# Patient Record
(demographics unavailable — no encounter records)

---

## 2024-11-26 NOTE — DISCUSSION/SUMMARY
[FreeTextEntry1] : 1. Hx of carotid artery aneurysm: s/p stenting and diverter in 2019. Recommend continuing Aspirin 81mg daily and rosuvastatin 10mg daily. Goal BP less than 130/80. Continued follow up with neurosurgery.  2. HTN: appears controlled. Goal BP less than 130/80. Patient noted her legs get swollen toward the end of the day and when she wears high heels, in the past. Better in the morning when she awakes. Recommended discontinuing amlodipine 5mg daily and starting losartan/HCTZ 50/12.5mg daily. The lower extremity greatly improved. Continue Toprol XL 25mg daily (high risk medication with no signs of toxicity).  3. HLD: continue rosuvastatin 10mg daily.  4. Chest Pain/Intermittent Palpitations: no ischemia noted on ETT and rare ectopy noted on 7 Day Zio. Patient recently went on vacation. She had to walk up hills. She noted central chest tightness when she did the walking. It resolved immediately with rest. Recommended CTA of the coronary arteries, which she underwent 4/12/2024. It demonstrated normal coronary arteries. Continue Toprol XL 25mg daily.  5. Dilated Aortic Root and Mild AI: goal BP less than 130/80. Continue Toprol XL 25mg daily. Yearly echo surveillance.   6. Lower Extremity Edema: Patient noted her legs get swollen toward the end of the day and when she wears high heels, in the past. Better in the morning when she awakes. Recommended discontinuing amlodipine 5mg daily and starting losartan/HCTZ 50/12.5mg daily. The lower extremity greatly improved.  Patient may proceed from a cardiology standpoint for planned cerebral angiography.  Follow up in 6 months. [EKG obtained to assist in diagnosis and management of assessed problem(s)] : EKG obtained to assist in diagnosis and management of assessed problem(s)

## 2024-11-26 NOTE — HISTORY OF PRESENT ILLNESS
[FreeTextEntry1] : Historical Perspective: 57 year old female with history of carotid artery aneurysm (proximal to the brain, s/p stent and diverter in 2019), HTN, HLD presents for a cardiac evaluation. Patient has no chest pain, SOB, or palpitations. BP is well controlled on amlodipine 5mg daily. Patient follows with neurosurgery regularly.   There is no history of MI, CVA, CHF, or previous coronary intervention.  Patient developed chest pain and palpitations. Patient getting fluttering in her chest on daily basis. She went to [facility] ED, 11/10/2022, and workup was negative.   Current Health Status: Patient with no chest pain, SOB, or palpitations. No hospitalizations since seeing me last. Remains compliant with medications and reports no adverse effects. Going for cerebral angiogram, 12/19/2024, [facility].

## 2024-11-26 NOTE — CARDIOLOGY SUMMARY
[de-identified] : 11/26/2024, NSR with low voltages 12/19/2023, NSR, poor R wave progression and non-specific T wave changes. 5/5/2022, NSR, poor R wave progression and non-specific T wave changes. [de-identified] : 11/18/2022, 7 Day Zio, NSR with rare ectopy. [de-identified] : 12/20/2022, Plain Treadmill Stress Test: Exercised for 9 minutes and 18 seconds utilizing Standard Tulio Protocol. No chest pain or abnormal dyspnea. No ECG changes to suggest ischemia.\par   [de-identified] : 6/21/2023, LV EF 55-60%, normal LV diastolic function, aortic root 3.7cm, trace AI, trace MR, trace-mild TR with estimated PASP of 18mmHg.\par  12/19/2022, LV EF 55-60%, normal LV diastolic function, aortic root at 4.0cm, mild AI, mild TR with estimated PASP of 17mmHg. [de-identified] : 4/12/2024, CTA Coronary Arteries: normal coronary arteries, ascending aorta 3.5cm, aortic root 3.6cm

## 2024-11-26 NOTE — PHYSICAL EXAM
[Normal] : moves all extremities, no focal deficits, normal speech [de-identified] : No carotid bruits auscultated bilaterally

## 2025-07-02 NOTE — CARDIOLOGY SUMMARY
[de-identified] : 7/2/2025, NSR with low voltages 11/26/2024, NSR with low voltages 12/19/2023, NSR, poor R wave progression and non-specific T wave changes. 5/5/2022, NSR, poor R wave progression and non-specific T wave changes. [de-identified] : 11/18/2022, 7 Day Zio, NSR with rare ectopy. [de-identified] : 12/20/2022, Plain Treadmill Stress Test: Exercised for 9 minutes and 18 seconds utilizing Standard Tulio Protocol. No chest pain or abnormal dyspnea. No ECG changes to suggest ischemia.\par   [de-identified] : 7/2/2025, LV EF 55-60%, normal LV diastolic function, aortic root 4.1cm and ascending aorta 4.04cm, trace MR, mild TR with estimated PASP of 29mmHg. 6/21/2023, LV EF 55-60%, normal LV diastolic function, aortic root 3.7cm, trace AI, trace MR, trace-mild TR with estimated PASP of 18mmHg. 12/19/2022, LV EF 55-60%, normal LV diastolic function, aortic root at 4.0cm, mild AI, mild TR with estimated PASP of 17mmHg. [de-identified] : 4/12/2024, CTA Coronary Arteries: normal coronary arteries, ascending aorta 3.5cm, aortic root 3.6cm

## 2025-07-02 NOTE — DISCUSSION/SUMMARY
[FreeTextEntry1] : 1. Hx of carotid artery aneurysm: s/p stenting and diverter in 2019. Recommend continuing Aspirin 81mg daily and rosuvastatin 10mg nightly. Goal BP less than 130/80. Continued follow up with neurosurgery.  2. HTN: appears controlled. Goal BP less than 130/80. Patient noted her legs get swollen toward the end of the day and when she wears high heels, in the past. Better in the morning when she awakes. Recommended discontinuing amlodipine 5mg daily and starting losartan/HCTZ 50/12.5mg daily. The lower extremity greatly improved. Continue Toprol XL 25mg daily.  3. HLD: continue rosuvastatin 10mg nightly.  4. Chest Pain/Intermittent Palpitations: no ischemia noted on ETT and rare ectopy noted on 7 Day Zio. Patient recently went on vacation. She had to walk up hills. She noted central chest tightness when she did the walking. It resolved immediately with rest. Recommended CTA of the coronary arteries, which she underwent 4/12/2024. It demonstrated normal coronary arteries. Continue Toprol XL 25mg daily.  5. Dilated Aortic Root and Mild AI: goal BP less than 130/80. Continue Toprol XL 25mg daily. Yearly echo surveillance. Recommend CTA Thoracic Aorta, gated with double oblique measurements, given increase in dimensions on echocardiogram today.  6. Lower Extremity Edema: Patient noted her legs get swollen toward the end of the day and when she wears high heels, in the past. Better in the morning when she awakes. Recommended discontinuing amlodipine 5mg daily and starting losartan/HCTZ 50/12.5mg daily. The lower extremity greatly improved.  Office will call with results.  Follow up in 6 months. [EKG obtained to assist in diagnosis and management of assessed problem(s)] : EKG obtained to assist in diagnosis and management of assessed problem(s)

## 2025-07-02 NOTE — HISTORY OF PRESENT ILLNESS
[FreeTextEntry1] : Historical Perspective: 57 year old female with history of carotid artery aneurysm (proximal to the brain, s/p stent and diverter in 2019), HTN, HLD presents for a cardiac evaluation. Patient has no chest pain, SOB, or palpitations. BP is well controlled on amlodipine 5mg daily. Patient follows with neurosurgery regularly.   There is no history of MI, CVA, CHF, or previous coronary intervention.  Patient developed chest pain and palpitations. Patient getting fluttering in her chest on daily basis. She went to [facility] ED, 11/10/2022, and workup was negative.   Current Health Status: Patient with no chest pain, SOB, or palpitations. No hospitalizations since seeing me last. Remains compliant with medications and reports no adverse effects.

## 2025-07-02 NOTE — PHYSICAL EXAM
[Normal] : moves all extremities, no focal deficits, normal speech [de-identified] : No carotid bruits auscultated bilaterally